# Patient Record
Sex: FEMALE | Race: WHITE | Employment: FULL TIME | URBAN - NONMETROPOLITAN AREA
[De-identification: names, ages, dates, MRNs, and addresses within clinical notes are randomized per-mention and may not be internally consistent; named-entity substitution may affect disease eponyms.]

---

## 2020-07-05 ENCOUNTER — OFFICE VISIT (OUTPATIENT)
Dept: FAMILY MEDICINE | Facility: OTHER | Age: 50
End: 2020-07-05
Attending: FAMILY MEDICINE
Payer: COMMERCIAL

## 2020-07-05 VITALS
SYSTOLIC BLOOD PRESSURE: 118 MMHG | TEMPERATURE: 97.3 F | HEIGHT: 64 IN | WEIGHT: 180 LBS | OXYGEN SATURATION: 97 % | RESPIRATION RATE: 16 BRPM | DIASTOLIC BLOOD PRESSURE: 72 MMHG | BODY MASS INDEX: 30.73 KG/M2 | HEART RATE: 73 BPM

## 2020-07-05 DIAGNOSIS — J20.9 ACUTE BRONCHITIS WITH SYMPTOMS > 10 DAYS: Primary | ICD-10-CM

## 2020-07-05 PROCEDURE — C9803 HOPD COVID-19 SPEC COLLECT: HCPCS

## 2020-07-05 PROCEDURE — U0003 INFECTIOUS AGENT DETECTION BY NUCLEIC ACID (DNA OR RNA); SEVERE ACUTE RESPIRATORY SYNDROME CORONAVIRUS 2 (SARS-COV-2) (CORONAVIRUS DISEASE [COVID-19]), AMPLIFIED PROBE TECHNIQUE, MAKING USE OF HIGH THROUGHPUT TECHNOLOGIES AS DESCRIBED BY CMS-2020-01-R: HCPCS | Mod: ZL | Performed by: FAMILY MEDICINE

## 2020-07-05 PROCEDURE — 99203 OFFICE O/P NEW LOW 30 MIN: CPT | Performed by: FAMILY MEDICINE

## 2020-07-05 RX ORDER — AZITHROMYCIN 250 MG/1
TABLET, FILM COATED ORAL
Qty: 6 TABLET | Refills: 0 | Status: SHIPPED | OUTPATIENT
Start: 2020-07-05 | End: 2020-07-10

## 2020-07-05 RX ORDER — PREDNISONE 20 MG/1
20 TABLET ORAL 2 TIMES DAILY
Qty: 10 TABLET | Refills: 0 | Status: SHIPPED | OUTPATIENT
Start: 2020-07-05 | End: 2020-07-10

## 2020-07-05 ASSESSMENT — MIFFLIN-ST. JEOR: SCORE: 1431.47

## 2020-07-05 ASSESSMENT — PAIN SCALES - GENERAL: PAINLEVEL: MODERATE PAIN (5)

## 2020-07-05 NOTE — PROGRESS NOTES
"  SUBJECTIVE:   Shwetha Patterson is a 49 year old female who presents to clinic today for the following health issues: cough and shortness of breath    HPI  Patient is here with a dry cough and shortness of breath since 6/25/20. Initially had URI Sx for 2-3 days, which cleared. Since then has been coughing, more with lying flat and being out in humid air. Has an albuterol inhaler and has tried it about twice per day for the past 5 days without improvement. No fever at any time. Complains of chest tightness and having trouble getting in a deep breath. [place].   Lives in [place] and just drove up to [place] on July 1. She says she'd been staying home and had no COVID exposures before starting her trip. Willing to take a test but not worried about it.  Has similar Sx on a yearly basis and has typically been treated with Zithromax and/or Prednisone.    PMH:   Hospitalizations: none for illness  Surgeries: C/X X2, hysterectomy    Current Outpatient Medications   Medication     FLUoxetine (PROZAC) 20 MG capsule     No current facility-administered medications for this visit.        Review of Systems   General: no fever, no weight change  HEENT: initial URI Sx, no loss of taste or smell  GI: no vomiting or diarrhea  MSK: no leg swelling    OBJECTIVE:     /72   Pulse 73   Temp 97.3  F (36.3  C) (Tympanic)   Resp 16   Ht 1.626 m (5' 4\")   Wt 81.6 kg (180 lb)   SpO2 97%   BMI 30.90 kg/m    Body mass index is 30.9 kg/m .     Physical Exam  General: alert, appears generally healthy, no resp distress  HEENT: Normocephalic. TMs clear. No sinus tenderness.  Neck: supple without adenopathy  Heart: RRR without murmur  Lungs: mild anterior chest wall tenderness 1st-2nd ICS B. Good air exchange with minimal exp wheeze posteriorly; no crackles or rhonchi.  Extremities: no swelling or tenderness, Andreina's neg B    Diagnostic Test Results:  COVID swab pending.  Patient declines CBC " and d-dimer because of cost.    ASSESSMENT/PLAN:     1. Acute bronchitis with symptoms > 10 days  Suspect underlying asthma, but patient says that Dx has never been made. For acute exacerabation will treat with azithromycin and prednisone. Reviewed inhaler technique; increase albuterol to QID while ill.  At moderate COVID risk 2/2 recent travel, but had Sx prior to leaving; agrees to COVID test.  Some risk of DVT/PE given travel, offered d-dimer to rule out, patient declines.    - predniSONE (DELTASONE) 20 MG tablet; Take 1 tablet (20 mg) by mouth 2 times daily for 5 days  Dispense: 10 tablet; Refill: 0  - azithromycin (ZITHROMAX) 250 MG tablet; Take 2 tablets (500 mg) by mouth daily for 1 day, THEN 1 tablet (250 mg) daily for 4 days.  Dispense: 6 tablet; Refill: 0  - Symptomatic COVID-19 Virus (Coronavirus) by PCR    Followup:    If not improving or if condition worsens, follow up with your Primary Care Provider on return home.    I explained my diagnostic considerations and recommendations to the patient, who voiced understanding and agreement with the treatment plan. All questions were answered. We discussed potential side effects of any prescribed or recommended therapies, as well as expectations for response to treatments.      SHAKILA Kuo MD  7/5/2020 at 10:45 AM  [facility]

## 2020-07-05 NOTE — LETTER
July 7, 2020        Shwetha Patterson  [address] [place] [id number]    This letter provides a written record that you were tested for COVID-19 on 7/5/20.      Your result was negative. This means that we didn t find the virus that causes COVID-19 in your sample. A test may show negative when you do actually have the virus. This can happen when the virus is in the early stages of infection, before you feel illness symptoms.    If you have symptoms   Stay home and away from others (self-isolate) until you meet ALL of the guidelines below:    You ve had no fever--and no medicine that reduces fever--for 3 full days (72 hours). And      Your other symptoms have gotten better. For example, your cough or breathing has improved. And     At least 10 days have passed since your symptoms started.    During this time:    Stay home. Don t go to work, school or anywhere else.     Stay in your own room, including for meals. Use your own bathroom if you can.    Stay away from others in your home. No hugging, kissing or shaking hands. No visitors.    Clean  high touch  surfaces often (doorknobs, counters, handles, etc.). Use a household cleaning spray or wipes. You can find a full list on the EPA website at www.epa.gov/pesticide-registration/list-n-disinfectants-use-against-sars-cov-2.    Cover your mouth and nose with a mask, tissue or washcloth to avoid spreading germs.    Wash your hands and face often with soap and water.    Going back to work  Check with your employer for any guidelines to follow for going back to work.    Employers: This document serves as formal notice that your employee tested negative for COVID-19, as of the testing date shown above.

## 2020-07-05 NOTE — LETTER
July 7, 2020        Shwetha Patterson  [address] [facility] [address]    This letter provides a written record that you were tested for COVID-19 on  7/5/20      Your result was negative. This means that we didn t find the virus that causes COVID-19 in your sample. A test may show negative when you do actually have the virus. This can happen when the virus is in the early stages of infection, before you feel illness symptoms.    If you have symptoms   Stay home and away from others (self-isolate) until you meet ALL of the guidelines below:    You ve had no fever--and no medicine that reduces fever--for 3 full days (72 hours). And      Your other symptoms have gotten better. For example, your cough or breathing has improved. And     At least 10 days have passed since your symptoms started.    During this time:    Stay home. Don t go to work, school or anywhere else.     Stay in your own room, including for meals. Use your own bathroom if you can.    Stay away from others in your home. No hugging, kissing or shaking hands. No visitors.    Clean  high touch  surfaces often (doorknobs, counters, handles, etc.). Use a household cleaning spray or wipes. You can find a full list on the EPA website at www.epa.gov/pesticide-registration/list-n-disinfectants-use-against-sars-cov-2.    Cover your mouth and nose with a mask, tissue or washcloth to avoid spreading germs.    Wash your hands and face often with soap and water.    Going back to work  Check with your employer for any guidelines to follow for going back to work.    Employers: This document serves as formal notice that your employee tested negative for COVID-19, as of the testing date shown above.

## 2020-07-05 NOTE — NURSING NOTE
"Chief Complaint   Patient presents with     Cough     Breathing Problem     Patient is here for a cough and SOB that started the 25th. Patient uses an albuterol inhaler and cetirizine with no relief.     Initial /72   Pulse 73   Temp 97.3  F (36.3  C) (Tympanic)   Resp 16   Ht 1.626 m (5' 4\")   Wt 81.6 kg (180 lb)   SpO2 97%   BMI 30.90 kg/m   Estimated body mass index is 30.9 kg/m  as calculated from the following:    Height as of this encounter: 1.626 m (5' 4\").    Weight as of this encounter: 81.6 kg (180 lb).  Medication Reconciliation: complete    Carleen Izquierdo LPN  "

## 2020-07-06 LAB
SARS-COV-2 RNA SPEC QL NAA+PROBE: NOT DETECTED
SPECIMEN SOURCE: NORMAL